# Patient Record
(demographics unavailable — no encounter records)

---

## 2024-11-01 NOTE — HISTORY OF PRESENT ILLNESS
[Work related] : work related [9] : 9 [Throbbing] : throbbing [] : yes [Intermittent] : intermittent [Work] : work [Sleep] : sleep [Nothing helps with pain getting better] : Nothing helps with pain getting better [de-identified] : WC DOI: 5/1/24 ([employer])  5.10.24 Patient here for left shoulder. Pain started on Wednesday on 5.1.24 at work. While he was grabbing for something he tripped and caused his arm to jerk. Still working.   5/17/24: here to follow up on MRI results for left shoulder. Notes relief with Medrol pack.   7/19/24: here to follow up on left shoulder calcific tendinitis. Notes some improvement. Continued discomfort with lifting arm. Experiences limited ROM. Currently working; [employer].   9/20/24: here to follow up on left shoulder. Notes no improvement. Continued pain with sleep/lifting arm. Experiences stiffness. Tried Tramadol with minimal relief. Never tried CSI. Currently working; [employer].   11/1/24: here to follow up on left shoulder. CSI ([facility]) from 9/20/24 gave no relief. Continued pain with lifting/extending. Takes Tramadol to assist with sleep. Currently working.  [FreeTextEntry3] : 5.1.24 [FreeTextEntry7] : shoulder to elbow

## 2024-11-01 NOTE — ASSESSMENT
[FreeTextEntry1] : He still having symptoms from the calcific tendinitis associated bursitis.  Advised he consult with the musculoskeletal radiologist to have the calcium removed.  I do not see any other way of helping him at this point.  I think surgical removal carries additional risk and morbidity when this can be easily addressed by a ultrasound-guided aspiration.  Follow-up 3 months to reassess.  He is working full duty

## 2024-11-01 NOTE — IMAGING
[de-identified] : Shoulder Exam Inspection: No swelling, no ecchymosis, no violetta deformity Palpation: Tenderness to palpation over greater tuberosity Stability: No instability or tenderness over AC joint Range of Motion: Active Forward Flexion 180 Passive FF: 180; ER: 90: IR: 70; ER at the side 50 Strength: 5/5 supraspinatus, infraspinatus and subscapularis; there is pain with strength testing Special testing: Positive Gomez test, positive impingement sign; Speeds and yergason negative Neuro: Motor and sensory intact distally

## 2024-12-18 NOTE — IMAGING
[de-identified] : Shoulder Exam Inspection: No swelling, no ecchymosis, no violetta deformity Palpation: Tenderness to palpation over greater tuberosity Stability: No instability or tenderness over AC joint Range of Motion: Active Forward Flexion 180 Passive FF: 170; ER: 80: IR: 70; ER at the side 50 Strength: 5/5 supraspinatus, infraspinatus and subscapularis; there is pain with strength testing Special testing: Positive Gomez test, positive impingement sign; Speeds and yergason negative Neuro: Motor and sensory intact distally

## 2024-12-18 NOTE — ASSESSMENT
[FreeTextEntry1] : Feels significantly better after the aspiration of the calcium deposit in the shoulder by the musculoskeletal radiologist.  Reasonable to return to work with no restrictions this Friday as planned.  Follow-up in 3 months to reassess.  Risk of recurrence reviewed but expect good outcome at this point.

## 2024-12-18 NOTE — REASON FOR VISIT
[FreeTextEntry2] : Follow up left shoulder pain has improved since he had us guided injection for calcific tendinitis on 12/5/24

## 2025-03-21 NOTE — ASSESSMENT
[FreeTextEntry1] : Presents for follow-up of left shoulder injury.  The symptoms have slowly gotten worse over the last few weeks.  He underwent successful lavage and barbotaged of the calcium deposit last year.  X-rays today show residual faint density but not enough to repeat aspiration.  I suspect this will continue to resorb but may intermittently cause flareup of bursitis as it resorbs.  Advised another course of the steroid pack prescription.  Eventually may benefit from surgical intervention.  The patient's current medication management of their orthopedic diagnosis was reviewed today: There is a moderate risk of morbidity with further treatment, especially from use of prescription strength medications and possible side effects of these medications which include upset stomach with oral medications, skin reactions to topical medications and cardiac/renal/diabetes issues with long term use.

## 2025-03-21 NOTE — IMAGING
[de-identified] : Shoulder Exam Inspection: No swelling, no ecchymosis, no violetta deformity Palpation: Tenderness to palpation over greater tuberosity Stability: No instability or tenderness over AC joint Range of Motion: Active Forward Flexion 160 Passive FF: 170; ER: 80: IR: 60; ER at the side 50 Strength: 5/5 supraspinatus, infraspinatus and subscapularis; there is pain with strength testing Special testing: Positive Gomez test, positive impingement sign; Speeds and yergason negative Neuro: Motor and sensory intact distally [Left] : left shoulder [Calcific density] : Calcific density [Type 2 acromion] : Type 2 acromion

## 2025-03-21 NOTE — HISTORY OF PRESENT ILLNESS
[Not working due to injury] : Work status: not working due to injury [] : yes [de-identified] : WC DOI: 5/1/24 ([employer])  5.10.24 Patient here for left shoulder. Pain started on Wednesday on 5.1.24 at work. While he was grabbing for something he tripped and caused his arm to jerk. Still working.   5/17/24: here to follow up on MRI results for left shoulder. Notes relief with Medrol pack.   7/19/24: here to follow up on left shoulder calcific tendinitis. Notes some improvement. Continued discomfort with lifting arm. Experiences limited ROM. Currently working; [employer].   9/20/24: here to follow up on left shoulder. Notes no improvement. Continued pain with sleep/lifting arm. Experiences stiffness. Tried Tramadol with minimal relief. Never tried CSI. Currently working; [employer].   11/1/24: here to follow up on left shoulder. CSI ([facility]) from 9/20/24 gave no relief. Continued pain with lifting/extending. Takes Tramadol to assist with sleep. Currently working.   03/21/2025: here to follow up on the left shoulder. c/o worsening pain that started in February. currently working

## 2025-06-13 NOTE — IMAGING
[de-identified] : Shoulder Exam Inspection: No swelling, no ecchymosis, no violetta deformity Palpation: Tenderness to palpation over greater tuberosity Stability: No instability or tenderness over AC joint Range of Motion: Active Forward Flexion 160 Passive FF: 170; ER: 80: IR: 60; ER at the side 50 Strength: 5/5 supraspinatus, infraspinatus and subscapularis; there is pain with strength testing Special testing: Positive Gomez test, positive impingement sign; Speeds and yergason negative Neuro: Motor and sensory intact distally [Left] : left shoulder [Calcific density] : Calcific density [FreeTextEntry1] : Stable calcifications

## 2025-06-13 NOTE — HISTORY OF PRESENT ILLNESS
[Work related] : work related [5] : 5 [Intermittent] : intermittent [Not working due to injury] : Work status: not working due to injury [de-identified] :  DOI: 5/1/24 ([employer])  5.10.24 Patient here for left shoulder. Pain started on Wednesday on 5.1.24 at work. While he was grabbing for something he tripped and caused his arm to jerk. Still working.   5/17/24: here to follow up on MRI results for left shoulder. Notes relief with Medrol pack.   7/19/24: here to follow up on left shoulder calcific tendinitis. Notes some improvement. Continued discomfort with lifting arm. Experiences limited ROM. Currently working; [employer].   9/20/24: here to follow up on left shoulder. Notes no improvement. Continued pain with sleep/lifting arm. Experiences stiffness. Tried Tramadol with minimal relief. Never tried CSI. Currently working; [employer].   11/1/24: here to follow up on left shoulder. CSI ([facility]) from 9/20/24 gave no relief. Continued pain with lifting/extending. Takes Tramadol to assist with sleep. Currently working.   03/21/2025: here to follow up on the left shoulder. c/o worsening pain that started in February. currently working   06/13/2025: Patient here for lt shoulder follow up under . States there is still pain but with improvement.  [] : no [FreeTextEntry1] : lt shoulder

## 2025-06-13 NOTE — ASSESSMENT
[FreeTextEntry1] : He has been managing conservatively.  The x-rays today show stable calcific densities within the shoulder.  He is not symptomatic enough for any additional treatments.  Likely approaching MMI at this point. MAG